# Patient Record
Sex: FEMALE | Race: WHITE | NOT HISPANIC OR LATINO | URBAN - METROPOLITAN AREA
[De-identification: names, ages, dates, MRNs, and addresses within clinical notes are randomized per-mention and may not be internally consistent; named-entity substitution may affect disease eponyms.]

---
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
## 2023-05-24 ENCOUNTER — COMPREHENSIVE EXAM (OUTPATIENT)
Dept: URBAN - METROPOLITAN AREA CLINIC 34 | Facility: CLINIC | Age: 18
End: 2023-05-24

## 2023-05-24 DIAGNOSIS — H52.223: ICD-10-CM

## 2023-05-24 DIAGNOSIS — E10.9: ICD-10-CM

## 2023-05-24 PROCEDURE — 92015 DETERMINE REFRACTIVE STATE: CPT

## 2023-05-24 PROCEDURE — 92014 COMPRE OPH EXAM EST PT 1/>: CPT

## 2023-05-24 ASSESSMENT — TONOMETRY
OD_IOP_MMHG: 16
OS_IOP_MMHG: 16

## 2023-05-24 ASSESSMENT — VISUAL ACUITY
OD_SC: 20/40
OS_SC: 20/40
OD_SC: J1+
OS_SC: J1+

## 2024-05-31 ENCOUNTER — COMPREHENSIVE EXAM (OUTPATIENT)
Dept: URBAN - METROPOLITAN AREA CLINIC 34 | Facility: CLINIC | Age: 19
End: 2024-05-31

## 2024-05-31 DIAGNOSIS — E10.9: ICD-10-CM

## 2024-05-31 DIAGNOSIS — H52.223: ICD-10-CM

## 2024-05-31 PROCEDURE — 92015 DETERMINE REFRACTIVE STATE: CPT

## 2024-05-31 PROCEDURE — 92014 COMPRE OPH EXAM EST PT 1/>: CPT

## 2024-05-31 ASSESSMENT — VISUAL ACUITY
OS_CC: 20/40
OD_CC: 20/30+2

## 2024-05-31 ASSESSMENT — TONOMETRY
OS_IOP_MMHG: 16
OD_IOP_MMHG: 17

## 2025-06-02 ENCOUNTER — COMPREHENSIVE EXAM (OUTPATIENT)
Age: 20
End: 2025-06-02

## 2025-06-02 DIAGNOSIS — H52.223: ICD-10-CM

## 2025-06-02 PROCEDURE — 92014 COMPRE OPH EXAM EST PT 1/>: CPT

## 2025-06-02 PROCEDURE — 92015 DETERMINE REFRACTIVE STATE: CPT
